# Patient Record
Sex: MALE | Race: OTHER | HISPANIC OR LATINO | Employment: FULL TIME | URBAN - METROPOLITAN AREA
[De-identification: names, ages, dates, MRNs, and addresses within clinical notes are randomized per-mention and may not be internally consistent; named-entity substitution may affect disease eponyms.]

---

## 2023-06-07 ENCOUNTER — HOSPITAL ENCOUNTER (EMERGENCY)
Facility: HOSPITAL | Age: 28
Discharge: HOME | End: 2023-06-08
Attending: EMERGENCY MEDICINE | Admitting: EMERGENCY MEDICINE
Payer: COMMERCIAL

## 2023-06-07 VITALS
HEART RATE: 78 BPM | TEMPERATURE: 97.6 F | OXYGEN SATURATION: 99 % | RESPIRATION RATE: 18 BRPM | SYSTOLIC BLOOD PRESSURE: 144 MMHG | DIASTOLIC BLOOD PRESSURE: 83 MMHG

## 2023-06-07 DIAGNOSIS — G51.0 BELL'S PALSY: Primary | ICD-10-CM

## 2023-06-07 NOTE — Clinical Note
Maddie Masters was seen and treated in our emergency department on 6/7/2023  as tolerated    Diagnosis: mars palsy    Mansoor Koehler  may return to work on return date  He may return on this date: 06/09/2023         If you have any questions or concerns, please don't hesitate to call        Mike Luis,     ______________________________           _______________          _______________  Hospital Representative                              Date                                Time

## 2023-06-08 RX ORDER — ACETAMINOPHEN 325 MG/1
650 TABLET ORAL ONCE
Status: COMPLETED | OUTPATIENT
Start: 2023-06-08 | End: 2023-06-08

## 2023-06-08 RX ORDER — LIDOCAINE 50 MG/G
1 PATCH TOPICAL ONCE
Status: DISCONTINUED | OUTPATIENT
Start: 2023-06-08 | End: 2023-06-08 | Stop reason: HOSPADM

## 2023-06-08 RX ORDER — PREDNISONE 20 MG/1
60 TABLET ORAL DAILY
Qty: 18 TABLET | Refills: 0 | Status: SHIPPED | OUTPATIENT
Start: 2023-06-08 | End: 2023-06-14

## 2023-06-08 RX ORDER — METHOCARBAMOL 500 MG/1
500 TABLET, FILM COATED ORAL ONCE
Status: COMPLETED | OUTPATIENT
Start: 2023-06-08 | End: 2023-06-08

## 2023-06-08 RX ORDER — IBUPROFEN 400 MG/1
400 TABLET ORAL ONCE
Status: COMPLETED | OUTPATIENT
Start: 2023-06-08 | End: 2023-06-08

## 2023-06-08 RX ORDER — PREDNISONE 20 MG/1
60 TABLET ORAL ONCE
Status: COMPLETED | OUTPATIENT
Start: 2023-06-08 | End: 2023-06-08

## 2023-06-08 RX ADMIN — LIDOCAINE 1 PATCH: 50 PATCH CUTANEOUS at 00:56

## 2023-06-08 RX ADMIN — IBUPROFEN 400 MG: 400 TABLET, FILM COATED ORAL at 00:55

## 2023-06-08 RX ADMIN — PREDNISONE 60 MG: 20 TABLET ORAL at 00:54

## 2023-06-08 RX ADMIN — METHOCARBAMOL 500 MG: 500 TABLET ORAL at 00:54

## 2023-06-08 RX ADMIN — ACETAMINOPHEN 650 MG: 325 TABLET, FILM COATED ORAL at 00:55

## 2023-06-08 NOTE — ED ATTENDING ATTESTATION
6/7/2023  Cheryl MAE MD, saw and evaluated the patient  I have discussed the patient with the resident/non-physician practitioner and agree with the resident's/non-physician practitioner's findings, Plan of Care, and MDM as documented in the resident's/non-physician practitioner's note, except where noted  All available labs and Radiology studies were reviewed  I was present for key portions of any procedure(s) performed by the resident/non-physician practitioner and I was immediately available to provide assistance  At this point I agree with the current assessment done in the Emergency Department  I have conducted an independent evaluation of this patient a history and physical is as follows:    ED Course  ED Course as of 06/08/23 0142   Thu Jun 08, 2023   0019 Per resident h&p 34 YO M L neck pain 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 took acetaminophen and ibuprofen without relief; on Wednesday L half of face feels numb O: well appearing M in no distress; no facial asymmetry; motor CN VII NL; I/P L neck pain; L facial numbness       Emergency Department Note- Catherine Mark 33 y o  male MRN: [id number]    Unit/Bed#: RW 04 Encounter: [id number]    Catherine Mark is a 33 y o  male who presents with   Chief Complaint   Patient presents with   • Neck Pain     Patient reports left sided neck pain since Sunday  Patient reports advil and tylenol is not working  Patient reports feeling like left side of face is numb  Patient denies remembering hurting his neck  History of Present Illness   HPI:  Catherine Mark is a 33 y o  male who presents for evaluation of:  Left sided neck pain since Sunday after doing some heavy lifting  Patient also notes some left facial weakness and numbness with tearing from his left eye  Patient denies any recent tick bites; he does not have any dogs  He denies left-sided hyperacusis and left facial rash  He denies any associated headache    He does not take any anticoagulants or antiplatelet medications  He has no history of prior stroke or cardiovascular disease  He took some ibuprofen and acetaminophen for his neck pain without relief of his symptoms  The neck discomfort is left lateral neck discomfort  Review of Systems   Constitutional: Negative for fatigue and fever  HENT: Negative for congestion and sore throat  Respiratory: Negative for cough and shortness of breath  Cardiovascular: Negative for chest pain and palpitations  Gastrointestinal: Negative for abdominal pain and nausea  Genitourinary: Negative for flank pain and frequency  Musculoskeletal: Positive for neck pain  Negative for back pain  Neurological: Negative for light-headedness and headaches  Psychiatric/Behavioral: Negative for dysphoric mood and hallucinations  All other systems reviewed and are negative  Historical Information   No past medical history on file  No past surgical history on file  Social History   Social History     Substance and Sexual Activity   Alcohol Use Not Currently     Social History     Substance and Sexual Activity   Drug Use Not Currently     Social History     Tobacco Use   Smoking Status Never   Smokeless Tobacco Never     Family History: No family history on file  Meds/Allergies   PTA meds:   None     No Known Allergies    Objective   First Vitals:   Blood Pressure: 144/83 (06/07/23 2237)  Pulse: 78 (06/07/23 2237)  Temperature: 97 6 °F (36 4 °C) (06/07/23 2237)  Temp Source: Oral (06/07/23 2237)  Respirations: 18 (06/07/23 2237)  SpO2: 99 % (06/07/23 2237)    Current Vitals:   Blood Pressure: 144/83 (06/07/23 2237)  Pulse: 78 (06/07/23 2237)  Temperature: 97 6 °F (36 4 °C) (06/07/23 2237)  Temp Source: Oral (06/07/23 2237)  Respirations: 18 (06/07/23 2237)  SpO2: 99 % (06/07/23 2237)    No intake or output data in the 24 hours ending 06/08/23 0142    Invasive Devices     None                 Physical Exam  Vitals and nursing note reviewed  "  Constitutional:       General: He is not in acute distress  Appearance: Normal appearance  He is well-developed  HENT:      Head: Normocephalic and atraumatic  Right Ear: External ear normal       Left Ear: External ear normal       Nose: Nose normal       Mouth/Throat:      Pharynx: No oropharyngeal exudate  Eyes:      General: No visual field deficit  Conjunctiva/sclera: Conjunctivae normal       Pupils: Pupils are equal, round, and reactive to light  Cardiovascular:      Rate and Rhythm: Normal rate and regular rhythm  Pulmonary:      Effort: Pulmonary effort is normal  No respiratory distress  Abdominal:      General: Abdomen is flat  There is no distension  Palpations: Abdomen is soft  Musculoskeletal:         General: No deformity  Normal range of motion  Cervical back: Normal range of motion and neck supple  Skin:     General: Skin is warm and dry  Capillary Refill: Capillary refill takes less than 2 seconds  Neurological:      Mental Status: He is alert and oriented to person, place, and time  Mental status is at baseline  GCS: GCS eye subscore is 4  GCS verbal subscore is 5  GCS motor subscore is 6  Cranial Nerves: Facial asymmetry (x mild on left) present  No dysarthria  Coordination: Coordination is intact  Coordination normal       Gait: Gait is intact  Psychiatric:         Mood and Affect: Mood normal          Behavior: Behavior normal          Thought Content: Thought content normal          Judgment: Judgment normal            Medical Decision Makin  Mild Bell's palsy: Oral corticosteroids; follow-up with primary care provider  No results found for this or any previous visit (from the past 36 hour(s))  No orders to display         Portions of the record may have been created with voice recognition software   Occasional wrong word or \"sound a like\" substitutions may have occurred due to the inherent limitations of voice " recognition software  Read the chart carefully and recognize, using context, where substitutions have occurred        Critical Care Time  Procedures

## 2023-06-08 NOTE — DISCHARGE INSTRUCTIONS
Tyrone Jensen was seen and evaluated today in the emergency department over your concern of left-sided facial weakness  The workup that we performed showed Bell's palsy  Please return to the emergency department if you experience inability to close eye, severe headache, rash, dizziness or any other signs and symptoms that may be concerning to you  Please follow-up with your primary care doctor within 1 day  All questions were answered prior to discharge  Thank you for choosing [facility] for your care

## 2023-06-08 NOTE — ED PROVIDER NOTES
History  Chief Complaint   Patient presents with   • Neck Pain     Patient reports left sided neck pain since Sunday  Patient reports advil and tylenol is not working  Patient reports feeling like left side of face is numb  Patient denies remembering hurting his neck  45-year-old male presents emergency department complaining of left-sided facial weakness  States this started this morning  On Sunday he started noticing a left-sided headache and neck stiffness  The headache comes and go however the neck stiffness remains  Has tried Tylenol Motrin and none of those have worked  No one else around him is sick  No recent infections  He does not venture outside into the wounds and has not recently found any ticks on him  He is complaining of paresthesias on the left side of his face  Additionally he is also complaining of some motor weakness of his left mouth  None       No past medical history on file  No past surgical history on file  No family history on file  I have reviewed and agree with the history as documented  E-Cigarette/Vaping     E-Cigarette/Vaping Substances     Social History     Tobacco Use   • Smoking status: Never   • Smokeless tobacco: Never   Substance Use Topics   • Alcohol use: Not Currently   • Drug use: Not Currently        Review of Systems   Neurological: Positive for facial asymmetry (Mild)  All other systems reviewed and are negative        Physical Exam  ED Triage Vitals   Temperature Pulse Respirations Blood Pressure SpO2   06/07/23 2237 06/07/23 2237 06/07/23 2237 06/07/23 2237 06/07/23 2237   97 6 °F (36 4 °C) 78 18 144/83 99 %      Temp Source Heart Rate Source Patient Position - Orthostatic VS BP Location FiO2 (%)   06/07/23 2237 06/07/23 2237 06/07/23 2237 06/07/23 2237 --   Oral Monitor Sitting Left arm       Pain Score       06/08/23 0055       8             Orthostatic Vital Signs  Vitals:    06/07/23 2237   BP: 144/83   Pulse: 78   Patient Position - Orthostatic VS: Sitting       Physical Exam  Vitals and nursing note reviewed  Constitutional:       General: He is not in acute distress  Appearance: He is well-developed  HENT:      Head: Normocephalic and atraumatic  Right Ear: Tympanic membrane and ear canal normal       Left Ear: Tympanic membrane and ear canal normal    Eyes:      Extraocular Movements: Extraocular movements intact  Conjunctiva/sclera: Conjunctivae normal       Pupils: Pupils are equal, round, and reactive to light  Cardiovascular:      Rate and Rhythm: Normal rate and regular rhythm  Heart sounds: No murmur heard  Pulmonary:      Effort: Pulmonary effort is normal  No respiratory distress  Breath sounds: Normal breath sounds  Abdominal:      Palpations: Abdomen is soft  Tenderness: There is no abdominal tenderness  Musculoskeletal:         General: No swelling  Cervical back: Neck supple  Skin:     General: Skin is warm and dry  Capillary Refill: Capillary refill takes less than 2 seconds  Neurological:      Mental Status: He is alert and oriented to person, place, and time  GCS: GCS eye subscore is 4  GCS verbal subscore is 5  GCS motor subscore is 6  Cranial Nerves: Cranial nerve deficit (Slight facial droop on the left side  No tongue deviation ) present  Sensory: Sensory deficit (Sensation diminished of left cranial nerve VII ) present     Psychiatric:         Mood and Affect: Mood normal          ED Medications  Medications   lidocaine (LIDODERM) 5 % patch 1 patch (1 patch Topical Medication Applied 6/8/23 0056)   acetaminophen (TYLENOL) tablet 650 mg (650 mg Oral Given 6/8/23 0055)   ibuprofen (MOTRIN) tablet 400 mg (400 mg Oral Given 6/8/23 0055)   methocarbamol (ROBAXIN) tablet 500 mg (500 mg Oral Given 6/8/23 0054)   predniSONE tablet 60 mg (60 mg Oral Given 6/8/23 0054)       Diagnostic Studies  Results Reviewed     None                 No orders to display Procedures  Procedures      ED Course                                       Medical Decision Making  13-year-old male presents emergency department with left-sided facial weakness    DDx: Bell's palsy, Merced Jimenez, tick paralysis    Plan: Steroids, symptomatic control     Patient most likely has early onset Bell's palsy  Patient received treatment emergency department, provided with return precautions, provided with information for ophthalmology and PCP as well as ambulatory referral   Patient verbalized understanding and was discharged to complete a 1 week course of steroids  Risk  OTC drugs  Prescription drug management  Disposition  Final diagnoses:   Bell's palsy     Time reflects when diagnosis was documented in both MDM as applicable and the Disposition within this note     Time User Action Codes Description Comment    6/8/2023 12:39 AM Buffy Bull Add [G51 0] Bell's palsy       ED Disposition     ED Disposition   Discharge    Condition   Stable    Date/Time   Thu Jun 8, 2023 12:39 AM    Comment   Leonardo Cedillo discharge to home/self care                 Follow-up Information     Follow up With Specialties Details Why Contact Info Additional [address] Emergency Department Emergency Medicine Go to  If symptoms worsen [address] [phone]  4 [facility] [address] [facility] Emergency Department, [address], [address] [facility] Internal Medicine Call in 3 days  [phone] [facility] [phone]  [facility] [address], [address] [facility] [address], [place], [place], [address] [phone]    Brandy Salas MD Ophthalmology Schedule an appointment as soon as possible for a visit in 1 week  [address]  [phone] [address] [place] [phone]             Patient's Medications   Discharge Prescriptions    PREDNISONE 20 MG TABLET    Take 3 tablets (60 mg total) by mouth daily for 6 days       Start Date: 6/8/2023  End Date: 6/14/2023       Order Dose: 60 mg       Quantity: 18 tablet    Refills: 0         PDMP Review     None           ED Provider  Attending physically available and evaluated Fe Gaffney I managed the patient along with the ED Attending      Electronically Signed by         Genoveva Thomas DO  06/08/23 0117